# Patient Record
Sex: MALE | Race: WHITE
[De-identification: names, ages, dates, MRNs, and addresses within clinical notes are randomized per-mention and may not be internally consistent; named-entity substitution may affect disease eponyms.]

---

## 2018-05-11 NOTE — ER DOCUMENT REPORT
ED Extremity Problem, Upper





- General


Chief Complaint: Laceration


Stated Complaint: LEFT ARM INJURY


Time Seen by Provider: 05/11/18 10:21


Mode of Arrival: Ambulatory


Information source: Patient


Notes: 





22-year-old male presents to ED for complaint of left arm laceration.  He 

states he dropped a piece of sheet metal onto his arm this morning while 

helping his [employer].  He has bleeding under control good pedal pulses good 

no rales checks able to move all fingers able to move his wrist in all fields.  

Patient was offered Tylenol Motrin did not want at this time.  She will be 

treated with tetanus and Keflex.  Suture set up has been ordered.


TRAVEL OUTSIDE OF THE [place] IN LAST 30 DAYS: No





- HPI


Patient complains to provider of: Injury, Pain, Left - Forearm, Forearm


Onset: This morning


Recent injury: Yes


Where: Outdoors


Quality of pain: Sharp


Pain Level: 2


Context: Other - Laceration


Associated symptoms: None


Exacerbated by: Movement


Relieved by: Nothing


Similar symptoms previously: Yes


Recently seen / treated by doctor: No





- Related Data


Allergies/Adverse Reactions: 


 





No Known Allergies Allergy (Unverified 05/11/18 10:11)


 











Past Medical History





- General


Information source: Patient





- Social History


Smoking Status: Never Smoker


Cigarette use (# per day): No


Chew tobacco use (# tins/day): No


Smoking Education Provided: No


Frequency of alcohol use: None


Drug Abuse: None


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


Lives with: Alone


Family History: CAD, Hyperlipidemia, Hypertension.  denies: Arthritis, COPD, CVA

, DM, Malignancy, Thyroid Disfunction


Patient has suicidal ideation: No


Patient has homicidal ideation: No





- Past Medical History


Cardiac Medical History: Reports: None


Pulmonary Medical History: Reports: None


EENT Medical History: Reports: None


Neurological Medical History: Reports: None


Endocrine Medical History: Reports: None


Renal/ Medical History: Reports: None


Malignancy Medical History: Reports None


GI Medical History: Reports: None


Musculoskeltal Medical History: Reports None


Skin Medical History: Reports None


Psychiatric Medical History: Reports: None


Traumatic Medical History: Reports: None


Infectious Medical History: Reports: None


Surgical Hx: Negative


Past Surgical History: Reports: None





- Immunizations


Hx Diphtheria, Pertussis, Tetanus Vaccination: Yes - 5/11/2018





Review of Systems





- Review of Systems


Constitutional: No symptoms reported


EENT: No symptoms reported


Cardiovascular: No symptoms reported


Respiratory: No symptoms reported


Gastrointestinal: No symptoms reported


Genitourinary: No symptoms reported


Male Genitourinary: No symptoms reported


Musculoskeletal: No symptoms reported


Skin: Other - 3 cm laceration to the left forearm


Hematologic/Lymphatic: No symptoms reported


Neurological/Psychological: No symptoms reported


-: Yes All other systems reviewed and negative





Physical Exam





- Vital signs


Vitals: 


 











Temp Pulse Resp BP Pulse Ox


 


 98.7 F   65   18   141/67 H  98 


 


 05/11/18 10:19  05/11/18 10:19  05/11/18 10:19  05/11/18 10:19  05/11/18 10:19











Interpretation: Normal





- General


General appearance: Appears well, Alert





- HEENT


Head: Normocephalic, Atraumatic


Eyes: Normal


Pupils: PERRL





- Respiratory


Respiratory status: No respiratory distress


Chest status: Nontender


Breath sounds: Normal


Chest palpation: Normal





- Cardiovascular


Rhythm: Regular


Heart sounds: Normal auscultation


Murmur: No





- Abdominal


Inspection: Normal


Distension: No distension


Bowel sounds: Normal


Tenderness: Nontender


Organomegaly: No organomegaly





- Back


Back: Normal, Nontender





- Extremities


General upper extremity: Normal inspection, Nontender, Normal color, Normal ROM

, Normal temperature


General lower extremity: Normal inspection, Nontender, Normal color, Normal ROM

, Normal temperature, Normal weight bearing.  No: Erasto's sign





- Neurological


Neuro grossly intact: Yes


Cognition: Normal


Orientation: AAOx4


[place] Coma Scale Eye Opening: Spontaneous


[place] Coma Scale Verbal: Oriented


[place] Coma Scale Motor: Obeys Commands


[place] Coma Scale Total: 15


Speech: Normal


Motor strength normal: LUE, RUE, LLE, RLE


Sensory: Normal





- Psychological


Associated symptoms: Normal affect, Normal mood





- Skin


Skin Temperature: Warm


Skin Moisture: Dry


Skin Color: Normal


Skin irregularity: Laceration


Location of irregularity: Extremities - 3 cm ragged laceration to the left 

forearm left forearm





Course





- Vital Signs


Vital signs: 


 











Temp Pulse Resp BP Pulse Ox


 


 98.8 F   64   18   136/67 H  98 


 


 05/11/18 11:05  05/11/18 11:05  05/11/18 11:05  05/11/18 11:05  05/11/18 11:05














Procedures





- Laceration/Wound Repair


  ** Left foreArm


Time completed: 11:02


Wound length (cm): 3


Wound's Depth, Shape: Irregular, Contused tissue


Laceration pre-procedure: Sterile PPE donned, Sterile drapes applied, Shur-

Clens applied


Anesthetic type: 1% Lidocaine


Volume Anesthetic (mLs): 8


Wound explored: Contaminated


Irrigated w/ Saline (mLs): 300


Wound Repaired With: Sutures


Suture Size/Type: 3:0, Ethilon


Number of Sutures: 4


Layer Closure?: No


Post-procedure wound care: Sterile dressing applied


Post-procedure NV exam normal: Yes


Complications: No





Discharge





- Discharge


Clinical Impression: 


Laceration of forearm, left


Qualifiers:


 Encounter type: initial encounter Qualified Code(s): S51.812A - Laceration 

without foreign body of left forearm, initial encounter





Condition: Stable


Disposition: HOME, SELF-CARE


Instructions:  Family Physicians / Practices


Additional Instructions: 


LACERATION CARE:





     Your laceration has been sutured to keep the skin edges aligned during 

healing.  The time of suture removal depends on the nature and location of your 

cut.  Please follow the care instructions the doctor has outlined for you and 

return for further care, according to the schedule you've been given.


     Keep the wound and dressing clean.  Unless you were told otherwise, you 

may shower daily, blotting the wound dry with a clean, unused towel.  At other 

times, If the dressing gets wet or blood soaked, remove it and blot the wound 

dry, then reapply a new dressing.  Unless you were instructed otherwise, 

dressings should be changed at least daily.


     If any signs of infection occur (swelling, redness, drainage, increasing 

tenderness, red streaks, tender lumps in the armpit or groin above the 

laceration, or fever), see the doctor immediately.








SOAP CLEANSING:


     Gently wash the wound daily using a mild soap (like Ivory, Phisoderm, 

Neutrogena).  Use warm water, rubbing gently until all debris, ooze, and 

crusting have been washed from the wound.  Allow to dry briefly (about 10 

minutes) after cleaning.  Repeat this cleansing at least three times a day for 

the first two days and then once or twice a day.








ANTIBIOTIC OINTMENT PROTECTION:


     Your wounds are such that dressing them is not practical or optional.  

After cleansing, you should apply a thin coating of antibiotic ointment (

Bacitracin, not Neosporin) to the wounds at least three times daily.  This 

lessens infection risk, and may decrease the amount of scarring.  Use a q-tip 

or dull butter knife, not your finger, to apply this ointment.


     Any debris or ooze which builds up in the ointment should be gently rubbed 

off with a sterile gauze pad.  Harder crusting may need to be gently scrubbed 

off with a clean wash cloth with soap and warm water, perhaps applying a warm, 

wet wash cloth to the wound for ten minutes first.


     Development of redness, severe itching, or blistering may mean allergy to 

the ointment.  See the doctor.








TETANUS IMMUNIZATION GIVEN:


     You have been given an immunization against tetanus.  Please record this 

in your records.  In general, a booster is needed only once every 10 years.  

The tetanus shot protects against tetanus or "lockjaw," which is a complication 

of certain wound infections (the tetanus shot cannot protect against the actual 

infection).


     The immunization site may become warm and red due to local reaction.  If 

this occurs, apply warm compresses and take aspirin or ibuprofen to reduce 

inflammation and discomfort.  Return for evaluation if the reaction becomes 

severe.








PROPHYLACTIC ANTIBIOTIC:


     The antibiotics which have been prescribed are designed to decrease the 

risk of infection.  Only certain types of wounds benefit from this -- the 

typical cut, scrape, or burn DOES NOT require antibiotics.  Of course, 

infection can still occur despite the use of prophylactic antibiotics.


     Your wound will heal with less chance of an infectious complication if you 

take the medication as directed.  The most important dose is the FIRST dose, so 

don't delay filling the prescription!





FOLLOW-UP CARE:


     Please return in ____3_ days for an infection check and dressing change.





    Your sutures should be removed in  ____10_  days.





    To facilitate a timely removal of your sutures, you may return to the 

Emergency Department at [facility].  You do not need to call for 

an appointment, but the best time to come in for suture removal is early in the 

morning.





     If you have been referred to another physician for follow-up care, call 

that physicians office for an appointment as you were instructed.  If you 

experience a significant change in your laceration, or if you are concerned 

there may be an infection (swelling, redness, drainage, increasing tenderness, 

red streaks, tender lumps in the armpit or groin above the laceration, or fever)

, return to the Emergency Department immediately re-evaluation.








Prescriptions: 


Cephalexin Monohydrate [Keflex 500 mg Capsule] 500 mg PO Q6H 5 Days  capsule


Forms:  Return to Work

## 2018-11-30 ENCOUNTER — HOSPITAL ENCOUNTER (OUTPATIENT)
Dept: HOSPITAL 62 - RAD | Age: 23
End: 2018-11-30
Attending: NURSE PRACTITIONER
Payer: OTHER GOVERNMENT

## 2018-11-30 DIAGNOSIS — N50.9: Primary | ICD-10-CM

## 2018-11-30 PROCEDURE — 76870 US EXAM SCROTUM: CPT

## 2018-11-30 NOTE — RADIOLOGY REPORT (SQ)
EXAM DESCRIPTION:  U/S SCROTUM W/O DOPPLER



COMPLETED DATE/TIME:  11/30/2018 11:23 am



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



COMPARISON:  None.



TECHNIQUE:  Static and realtime gray scale imaging of the scrotum and testes.  Selected color Doppler
 and spectral images recorded to document blood flow.



LIMITATIONS:  None.



FINDINGS:  RIGHT:

TESTICLE: Normal size, 3.5 x 2.8 x 2.1 cm. Normal echotexture.  Normal blood flow.  No mass.

EPIDIDYMIS: Normal size, 8 mm.  There is an epididymal cyst.  This measures 9 mm.

HYDROCELE OR VARICOCELE: No.

HERNIA OR EXTRA-TESTICULAR MASS: No.

OTHER: No other significant finding.

LEFT:

TESTICLE: Normal size, 3.2 x 2.7 x 2 cm. Normal echotexture.  Normal blood flow.  No mass.

EPIDIDYMIS: Normal.  12 mm.

HYDROCELE OR VARICOCELE: No.

HERNIA OR EXTRA-TESTICULAR MASS: No.

OTHER: No other significant finding.



IMPRESSION:  The study is essentially normal.  There is a 9 mm epididymal cyst on the right in the ar
ea indicated by the patient as the the site of a palpable lump.



TECHNICAL DOCUMENTATION:  JOB ID:  [id number]

 2011 [employer]- All Rights Reserved



Reading location - IP/workstation name: [email or web address]